# Patient Record
Sex: MALE | Race: WHITE | NOT HISPANIC OR LATINO | URBAN - METROPOLITAN AREA
[De-identification: names, ages, dates, MRNs, and addresses within clinical notes are randomized per-mention and may not be internally consistent; named-entity substitution may affect disease eponyms.]

---

## 2025-03-31 ENCOUNTER — OFFICE VISIT (OUTPATIENT)
Dept: URGENT CARE | Age: 20
End: 2025-03-31
Payer: COMMERCIAL

## 2025-03-31 VITALS
DIASTOLIC BLOOD PRESSURE: 80 MMHG | HEART RATE: 89 BPM | OXYGEN SATURATION: 98 % | SYSTOLIC BLOOD PRESSURE: 142 MMHG | RESPIRATION RATE: 16 BRPM | TEMPERATURE: 97.5 F

## 2025-03-31 DIAGNOSIS — J02.9 VIRAL PHARYNGITIS: Primary | ICD-10-CM

## 2025-03-31 DIAGNOSIS — J02.9 SORE THROAT: ICD-10-CM

## 2025-03-31 LAB
POC RAPID MONO: NEGATIVE
POC RAPID STREP: NEGATIVE

## 2025-03-31 PROCEDURE — 87880 STREP A ASSAY W/OPTIC: CPT | Performed by: PHYSICIAN ASSISTANT

## 2025-03-31 PROCEDURE — 86308 HETEROPHILE ANTIBODY SCREEN: CPT | Performed by: PHYSICIAN ASSISTANT

## 2025-03-31 PROCEDURE — 99203 OFFICE O/P NEW LOW 30 MIN: CPT | Performed by: PHYSICIAN ASSISTANT

## 2025-03-31 ASSESSMENT — ENCOUNTER SYMPTOMS
FATIGUE: 1
FEVER: 0
CHILLS: 0
SHORTNESS OF BREATH: 0
RHINORRHEA: 0
COUGH: 0
NAUSEA: 0
SORE THROAT: 1
VOMITING: 0

## 2025-03-31 NOTE — PROGRESS NOTES
Subjective   Patient ID: Jhonny Alejandre is a 19 y.o. male. They present today with a chief complaint of Sore Throat (Sore throat x 5 days).    History of Present Illness  Patient presents for 5-day history of sore throat that he reports is worse on the right.  He states he has had slight congestion, postnasal drainage and pain that does go into his right ear.  He denies any fevers, chills, headache, body aches, cough.  He denies any known exposure to strep or mono.      Sore Throat   Associated symptoms include congestion (slight) and ear pain (mild on right). Pertinent negatives include no coughing, shortness of breath or vomiting.       Past Medical History  Allergies as of 03/31/2025    (No Known Allergies)       (Not in a hospital admission)       No past medical history on file.    No past surgical history on file.         Review of Systems  Review of Systems   Constitutional:  Positive for fatigue. Negative for chills and fever.   HENT:  Positive for congestion (slight), ear pain (mild on right), postnasal drip and sore throat. Negative for rhinorrhea.    Respiratory:  Negative for cough and shortness of breath.    Gastrointestinal:  Negative for nausea and vomiting.                                  Objective    Vitals:    03/31/25 1912   BP: 142/80   Pulse: 89   Resp: 16   Temp: 36.4 °C (97.5 °F)   SpO2: 98%     No LMP for male patient.    Physical Exam  Vitals reviewed.   Constitutional:       General: He is not in acute distress.     Appearance: He is not ill-appearing.   HENT:      Head: Normocephalic.      Right Ear: Tympanic membrane and ear canal normal.      Left Ear: Tympanic membrane and ear canal normal.      Nose: No congestion or rhinorrhea.      Mouth/Throat:      Mouth: Mucous membranes are moist.      Pharynx: Posterior oropharyngeal erythema present. No oropharyngeal exudate.      Tonsils: No tonsillar exudate or tonsillar abscesses. 1+ on the right. 1+ on the left.   Cardiovascular:      Rate  and Rhythm: Normal rate and regular rhythm.      Heart sounds: Normal heart sounds.   Pulmonary:      Effort: Pulmonary effort is normal. No respiratory distress.      Breath sounds: Normal breath sounds.   Lymphadenopathy:      Cervical: Cervical adenopathy (tender on right) present.         Procedures    Point of Care Test & Imaging Results from this visit  Results for orders placed or performed in visit on 03/31/25   POCT rapid strep A manually resulted   Result Value Ref Range    POC Rapid Strep Negative Negative   POCT Infectious mononucleosis antibody manually resulted   Result Value Ref Range    POC Rapid Mono Negative Negative      Imaging  No results found.    Cardiology, Vascular, and Other Imaging  No other imaging results found for the past 2 days      Diagnostic study results (if any) were reviewed by Radha Ahumada PA-C.    Assessment/Plan   Allergies, medications, history, and pertinent labs/EKGs/Imaging reviewed by Radha Ahumada PA-C.     Medical Decision Making  MDM- Signs and symptoms consistent with acute viral pharyngitis without evidence of PTA, deep neck infection, or sepsis. Negative Rapid strep and mono in office. Will treat with supportive measures. Patient is encouraged to return to clinic if symptoms worsen and will otherwise follow with PCP. Patient verbalized understanding and agrees with plan.       Orders and Diagnoses  Diagnoses and all orders for this visit:  Viral pharyngitis  Sore throat  -     POCT rapid strep A manually resulted  -     POCT Infectious mononucleosis antibody manually resulted      Medical Admin Record      Patient disposition: Home    Electronically signed by Radha Ahumada PA-C  7:34 PM